# Patient Record
Sex: MALE | Race: WHITE | URBAN - METROPOLITAN AREA
[De-identification: names, ages, dates, MRNs, and addresses within clinical notes are randomized per-mention and may not be internally consistent; named-entity substitution may affect disease eponyms.]

---

## 2017-03-03 DIAGNOSIS — Z94.0 KIDNEY TRANSPLANT RECIPIENT: Primary | ICD-10-CM

## 2017-03-03 DIAGNOSIS — Z79.60 LONG-TERM USE OF IMMUNOSUPPRESSANT MEDICATION: ICD-10-CM

## 2017-03-03 RX ORDER — MYCOPHENOLATE MOFETIL 250 MG/1
1000 CAPSULE ORAL 2 TIMES DAILY
Qty: 240 CAPSULE | Refills: 0 | Status: SHIPPED | OUTPATIENT
Start: 2017-03-03 | End: 2017-04-11

## 2017-04-11 DIAGNOSIS — Z94.0 KIDNEY REPLACED BY TRANSPLANT: ICD-10-CM

## 2017-04-11 DIAGNOSIS — Z79.60 LONG-TERM USE OF IMMUNOSUPPRESSANT MEDICATION: ICD-10-CM

## 2017-04-11 DIAGNOSIS — Z94.0 KIDNEY TRANSPLANT RECIPIENT: ICD-10-CM

## 2017-04-11 RX ORDER — TACROLIMUS 1 MG/1
CAPSULE ORAL
Qty: 360 CAPSULE | Refills: 10 | Status: SHIPPED | OUTPATIENT
Start: 2017-04-11 | End: 2018-04-19

## 2017-04-11 RX ORDER — MYCOPHENOLATE MOFETIL 250 MG/1
CAPSULE ORAL
Qty: 240 CAPSULE | Refills: 0 | Status: SHIPPED | OUTPATIENT
Start: 2017-04-11

## 2017-06-12 DIAGNOSIS — Z94.0 KIDNEY REPLACED BY TRANSPLANT: ICD-10-CM

## 2017-06-12 RX ORDER — MYCOPHENOLATE MOFETIL 250 MG/1
1000 CAPSULE ORAL 2 TIMES DAILY
Qty: 240 CAPSULE | Refills: 11 | Status: SHIPPED | OUTPATIENT
Start: 2017-06-12

## 2018-03-21 ENCOUNTER — TRANSFERRED RECORDS (OUTPATIENT)
Dept: HEALTH INFORMATION MANAGEMENT | Facility: CLINIC | Age: 48
End: 2018-03-21

## 2018-04-19 ENCOUNTER — TELEPHONE (OUTPATIENT)
Dept: TRANSPLANT | Facility: CLINIC | Age: 48
End: 2018-04-19

## 2018-04-19 DIAGNOSIS — Z94.0 KIDNEY REPLACED BY TRANSPLANT: ICD-10-CM

## 2018-04-19 RX ORDER — TACROLIMUS 1 MG/1
CAPSULE ORAL
Qty: 60 CAPSULE | Refills: 0 | Status: SHIPPED | OUTPATIENT
Start: 2018-04-19

## 2018-04-20 NOTE — TELEPHONE ENCOUNTER
The federal rules and regulations that govern the refilling of medications by physicians and pharmacies no longer allow us to refill mediations without a yearly face-to-face visit between the patient and physician.      LPN task:  Call Marvin Rivera and invite back for Annual face-to-face appointment with Transplant Nephrology.  Send a message to Annette Adame to set-up visit.    If requesting Clinic Visit at [facility] in [place] or [place], send message to Arleen Sanchez to set-up.    Otherwise patient can see a local Nephrologist who agrees to prescribe transplant medications for him/her.    If already seeing a local Nephrologist who is willing to prescribe his immunosuppression, get Provider name/Facility name.    Mail letter inviting yearly face to face visit with Transplant Nephrology.    Tacrolimus good for 1 month, 0 refills sent to: [id number] - [place], [place] - [address].

## 2018-04-20 NOTE — TELEPHONE ENCOUNTER
Spoke to patient who states that they will see Dr. Acosta locally and have txp meds filled through them.  Informed patient that 30 day supply was sent to pharmacy.  Patient will call and make f/u appt. With Dr. Acosta's office.

## 2018-06-07 ENCOUNTER — TELEPHONE (OUTPATIENT)
Dept: TRANSPLANT | Facility: CLINIC | Age: 48
End: 2018-06-07

## 2018-06-07 DIAGNOSIS — Z94.0 KIDNEY REPLACED BY TRANSPLANT: Primary | ICD-10-CM

## 2018-06-07 NOTE — TELEPHONE ENCOUNTER
Spoke to patient who states that they will see Dr. Acosta locally and have txp meds filled through them.  Informed patient that 30 day supply was sent to pharmacy.  Patient will call and make f/u appt. With Dr. Acosta's office.        Electronically signed by Nida Villela LPN at 4/20/2018 12:12 PM     Last seen here at the [facility] 2010  Needing refills.   Call Marvin Rivera and check if he was seen by Dr. Acosta.  Also if Dr. Acosta prescribing his transplant meds?   If Dr. Acosta is not prescribing, Federal regulations requires him to see Nephrologist here at the [facility] annually.

## 2018-06-08 RX ORDER — TACROLIMUS 1 MG/1
2 CAPSULE ORAL 2 TIMES DAILY
Qty: 120 CAPSULE | Refills: 0 | OUTPATIENT
Start: 2018-06-08

## 2018-06-08 NOTE — TELEPHONE ENCOUNTER
Left message for patient regarding confirmation of local Nephrologist and Dr. Acosta prescribing IS refills.  Instructed patient return call to confirm due to recent IS refill request received by [facility] center.

## 2018-06-08 NOTE — TELEPHONE ENCOUNTER
Patient returned call and confirmed that he had an annual exam with Dr. Acosta last month and that he will have Dr. Acosta refill his IS medications.

## 2018-09-21 ENCOUNTER — TELEPHONE (OUTPATIENT)
Dept: TRANSPLANT | Facility: CLINIC | Age: 48
End: 2018-09-21

## 2018-09-21 NOTE — LETTER
PHYSICIAN ORDERS      DATE & TIME ISSUED: 2018 3:56 PM  PATIENT NAME: Marvin Rivera   : 1970     [facility] MR# [if applicable]: [id number]     DIAGNOSIS:  Kidney Transplant  ICD-10 CODE: z 94.0     Please draw the following lab in 1-2 weeks  Tacrolimus drug level (12 hour trough)    Any questions please call: [facility] [place] transplant [phone]  Please fax results to [phone].    .

## 2018-09-21 NOTE — TELEPHONE ENCOUNTER
ISSUE:  Tacrolimus level 2.9  Goal 3-5    OUTCOME:   Pt managed by Dr Acosta  Spoke with pt regarding drug level.  Pt reports 14 hour trough.  Pt educated we would recommend he repeats his drug level in 2 weeks with good 12 hour trough.  No dose change at this time. Pt v/u. Orders placed.    Call placed to Dr Acosta's office.  RNCC spoke with his RN regarding pt drug level being 14 hour trough, and our recommendation to have pt repeat level in 2 weeks.

## 2019-05-08 ENCOUNTER — TELEPHONE (OUTPATIENT)
Dept: TRANSPLANT | Facility: CLINIC | Age: 49
End: 2019-05-08

## 2019-05-08 NOTE — TELEPHONE ENCOUNTER
[facility] to [facility]. Spoke with Melissa. She v\u that [facility] doesn't issue prophylactic antibiotic treatment for patients greater than 6 months post transplant. Letter faxed.

## 2019-05-08 NOTE — TELEPHONE ENCOUNTER
Provider Call: General  Route to LPN    Reason for call: Call received- they faxed  Document on Mon  For use of antibiotics for dental procedure- Call them back to let them know if we received it or not     Call back needed? Yes    Return Call Needed  Same as documented in contacts section  When to return call?: Greater than one day: Route standard priority

## 2019-05-08 NOTE — LETTER
PHYSICIAN ORDERS    DATE & TIME ISSUED: May 8, 2019 9:37 AM  PATIENT NAME: Marvin Rivera   : 1970        DIAGNOSIS:  Kidney transplant   ICD-10 CODE: Z94.0     To Whom it May Concern,     Marvin Rivera is a post kidney transplant patient with stable renal function. There are no differences known regarding the delivery of dental care, including local anesthesia, with or without epinephrine, to solid organ transplant patients versus the general population.    The transplant center recommends waiting 6 months after transplant for dental work, if dental work is needed before that time, antibiotics is recommended. After 6 months post transplant antibiotics are not needed. Please note that there are many other clinical reasons antibiotics may be needed (prosthetic heart valve, joint implants, ect), this should be discussed between patient and dentist.     If dental work is required before 6 months post transplant, the Transplant Center recommends:     Amoxicillin 2 gm 1-hour before the procedure      Or     Clindamycin 600 mg 1- hour before the procedure    This medication should be prescribed by your dentist after discussion of your health history.     Currently the risk and benefits of dental procedures are not dependent to the transplanted organ. Please follow your usual practice.     Sincerely,     Post Transplant Coordinator    Any questions please call: [phone] option #5

## 2020-06-01 ENCOUNTER — TRANSFERRED RECORDS (OUTPATIENT)
Dept: HEALTH INFORMATION MANAGEMENT | Facility: CLINIC | Age: 50
End: 2020-06-01

## 2021-06-03 ENCOUNTER — TRANSFERRED RECORDS (OUTPATIENT)
Dept: HEALTH INFORMATION MANAGEMENT | Facility: CLINIC | Age: 51
End: 2021-06-03

## 2021-06-03 LAB
CHOLESTEROL (EXTERNAL): 186 MG/DL (ref 100–200)
HDLC SERPL-MCNC: 59 MG/DL (ref 40–59)
LDL CHOLESTEROL CALCULATED (EXTERNAL): 80 MG/DL (ref 80–130)
NON HDL CHOLESTEROL (EXTERNAL): 127 MG/DL
TRIGLYCERIDES (EXTERNAL): 234 MG/DL (ref 20–149)

## 2025-04-15 ENCOUNTER — TRANSFERRED RECORDS (OUTPATIENT)
Dept: HEALTH INFORMATION MANAGEMENT | Facility: CLINIC | Age: 55
End: 2025-04-15

## 2025-05-06 ENCOUNTER — TRANSFERRED RECORDS (OUTPATIENT)
Dept: HEALTH INFORMATION MANAGEMENT | Facility: CLINIC | Age: 55
End: 2025-05-06
Payer: COMMERCIAL

## 2025-05-12 ENCOUNTER — TRANSFERRED RECORDS (OUTPATIENT)
Dept: HEALTH INFORMATION MANAGEMENT | Facility: CLINIC | Age: 55
End: 2025-05-12
Payer: COMMERCIAL